# Patient Record
(demographics unavailable — no encounter records)

---

## 2025-04-24 NOTE — DISCUSSION/SUMMARY
[FreeTextEntry1] : This j carlos patient began transitioning since 2010 She has been on hormonal therapy consistently since 2012  She has been in therapy and was diagnosed with Gender Dysphoria concerned about facial features that she thinks are masculine looking desires facial feminization revision surgery  She desires to have the following revisions -Jawline -Chin  Allergies: NKA Meds:  Spironolactone 100mg Progesterone 100mg Biktarvy 50mg  PMH: HIV, depression  SH: no tobacco use,  Past surgical history:  Breast augmentation, 2018, [facility] with Dr Sanz, no complications  Facial feminization, 2021, Dr Matthew Benitez, no complications  ROS: General health / Constitutional  no fever, no chills, no unusual weight changes, no energy level changes, no night sweats Skin  No color or pigmentation changes, no pruritis, no rashes, no ulcers, Hair  No changes in color, texture, distribution, loss Nails  No color changes, brittleness, infection Head  No headaches, no new jaw pain Eyes  Good visual acuity, no color blindness, no corrective lenses, no photophobia, no diplopia, no blurred vision, no infection, pain, no medications, Ear  no tinnitus, no discharge, no pain, no medications Nose  no epistaxis, no rhinorrhea, no rhinitis, no pain, Mouth & Throat  no gingivitis, no gingival bleeding, no ulcers, no voice changes, no changes in oral mucosa or tongue Neck  no stiffness, no pain, no lymphadenitis, no thyroid disorders, Respiratory  no cough, no dyspnea, no wheezing, no chest pain, cyanosis, no pneumonia, no asthma, Cardiovascular  no chest pain, no palpitations, no irregular rhythm, no tachycardia, no bradycardia, no heart failure, no dyspnea on exertion (ARCHULETA), no edema Gastrointestinal  no nausea / vomiting, no dysphagia, no reflux / GERD, no abdominal pain, no jaundice Musculoskeletal  no pain in muscles, bones, or joints; no fractures, no dislocations, no muscular weakness, no atrophy Neurological  no paresis, no paralysis, no paresthesia, no seizures, no dizziness, no syncope, no ataxia, no tremor Psychological  no childhood behavioral problems, no irritability, no sleep changes Hematological  no anemia, no bruising, no bleeding tendencies,  PHYSICAL EXAM General WDWN, in no distress, A & O x 3 (person, place, time) HEENT Head: AT/NC (atraumatic, normocephalic), including TMJ, sensory and motor; Improved forehead shape Eyes: EOMI, PERRLA Ears: exterior, nl hearing, Nose: improved shape Throat & mouth: gd palate elevation, nl tongue mobility, nl tonsil size + Despite previous procedure has broad protruding chin and large mandibular body that are masculine features (mandibular angle shape improved) Neck: no masses, nl pulses, no prominent tracheal bulge ("Lukas's Apple")  We had a 45 minute meeting with the patient discussing diagnosis and medical management issues and outcomes.  21209 mandibular angle reduction/resection 21122 osseous genioplasty,  20680 removal of hardware   21209 (mandibular angle resection/reduction): Patient began with a very large mandible and a very prominent chin. Despite previous surgery reducing her mandibular angle, she has a large mandibular body that was not reduced.  An inferior border reduction will correct the mandibular shape. CT scan confirmed this need for a mandibular revision.   21122 (osseous genioplasty): Patient previously had a very wide chin. The first procedure narrowed the bony chin; however, she still has too much of a broad chin. This needs to be further reduced with hardware removal, lateral resection with reciprocating saw and recution of the AP projection. CT scan confirms the previous surgery and need for revision.  Met with the patient, reviewed all questions and concerns, took photos, and reviewed pre-surgical packet. Needs CT scan (order sent), PCP clearance and letters from providers.  The patient met with Matthew Benitez M.D.  Patient seen and examined by me, Dr. Matthew Benitez, personally. Treatment plan reviewed with patient by me. Physician extender was present for assistance only. I attest that the note reflects the visit and our care. All patient questions and concerns were answered and addressed during this appointment time.